# Patient Record
Sex: MALE | URBAN - METROPOLITAN AREA
[De-identification: names, ages, dates, MRNs, and addresses within clinical notes are randomized per-mention and may not be internally consistent; named-entity substitution may affect disease eponyms.]

---

## 2024-07-20 ENCOUNTER — OFFICE VISIT (OUTPATIENT)
Dept: URGENT CARE | Facility: CLINIC | Age: 64
End: 2024-07-20
Payer: COMMERCIAL

## 2024-07-20 VITALS
OXYGEN SATURATION: 98 % | DIASTOLIC BLOOD PRESSURE: 82 MMHG | HEART RATE: 96 BPM | RESPIRATION RATE: 20 BRPM | SYSTOLIC BLOOD PRESSURE: 164 MMHG | TEMPERATURE: 98 F

## 2024-07-20 DIAGNOSIS — N39.0 ACUTE UTI: Primary | ICD-10-CM

## 2024-07-20 DIAGNOSIS — R35.0 URINARY FREQUENCY: ICD-10-CM

## 2024-07-20 LAB
POC APPEARANCE, URINE: ABNORMAL
POC BILIRUBIN, URINE: ABNORMAL
POC BLOOD, URINE: ABNORMAL
POC COLOR, URINE: ABNORMAL
POC GLUCOSE, URINE: ABNORMAL MG/DL
POC KETONES, URINE: ABNORMAL MG/DL
POC LEUKOCYTES, URINE: ABNORMAL
POC NITRITE,URINE: NEGATIVE
POC PH, URINE: 5 PH
POC PROTEIN, URINE: ABNORMAL MG/DL
POC SPECIFIC GRAVITY, URINE: 1.02
POC UROBILINOGEN, URINE: 4 EU/DL

## 2024-07-20 PROCEDURE — 81002 URINALYSIS NONAUTO W/O SCOPE: CPT | Performed by: PHYSICIAN ASSISTANT

## 2024-07-20 PROCEDURE — 99203 OFFICE O/P NEW LOW 30 MIN: CPT | Performed by: PHYSICIAN ASSISTANT

## 2024-07-20 PROCEDURE — 87086 URINE CULTURE/COLONY COUNT: CPT

## 2024-07-20 PROCEDURE — 87186 SC STD MICRODIL/AGAR DIL: CPT

## 2024-07-20 RX ORDER — SULFAMETHOXAZOLE AND TRIMETHOPRIM 800; 160 MG/1; MG/1
1 TABLET ORAL 2 TIMES DAILY
Qty: 20 TABLET | Refills: 0 | Status: SHIPPED | OUTPATIENT
Start: 2024-07-20 | End: 2024-07-30

## 2024-07-20 ASSESSMENT — PAIN SCALES - GENERAL: PAINLEVEL: 4

## 2024-07-20 ASSESSMENT — ENCOUNTER SYMPTOMS: FREQUENCY: 1

## 2024-07-20 NOTE — PROGRESS NOTES
Subjective   Patient ID: Pascual Julien is a 63 y.o. male.    Patient is a 63-year-old male who complains of urinary frequency and urgency that he has been experiencing for the past 2 days.  Patient denies fever, chills, flank pain, hematuria, nausea or other symptoms.  Patient states he does have a history of kidney stone but at reports his current symptoms are not consistent with same.  Patient reports that he has had 1 previous urinary tract infection 24 years ago and believes that his current symptoms are due to a UTI.      Urinary Frequency  The following portions of the chart were reviewed this encounter and updated as appropriate:  Allergies       Review of Systems   Genitourinary:  Positive for frequency and urgency.   All other systems reviewed and are negative.  Objective   Physical Exam  Vitals and nursing note reviewed.   Constitutional:       Appearance: Normal appearance. He is normal weight.   Cardiovascular:      Pulses: Normal pulses.   Pulmonary:      Effort: Pulmonary effort is normal.      Breath sounds: Normal breath sounds.   Abdominal:      Palpations: Abdomen is soft.   Skin:     General: Skin is warm and dry.      Capillary Refill: Capillary refill takes less than 2 seconds.   Neurological:      General: No focal deficit present.      Mental Status: He is alert and oriented to person, place, and time.   Psychiatric:         Mood and Affect: Mood normal.         Behavior: Behavior normal.         Thought Content: Thought content normal.         Judgment: Judgment normal.     Assessment/Plan   Physical exam findings as noted above.  Urinalysis shows large leukocyte esterase with nitrite and urine culture was ordered.  Patient was provided with a prescription for Bactrim -160 mg and advised that results of the urine culture be available in 2 days.  Patient was informed that he would be contacted if there is a need to change his medication based on the sensitivity report.  Patient  verbalizes clear understanding the above instructions.    CLINICAL IMPRESSION:  Acute UTI    Diagnoses and all orders for this visit:  Acute UTI  -     Urine Culture  -     sulfamethoxazole-trimethoprim (Bactrim DS) 800-160 mg tablet; Take 1 tablet by mouth 2 times a day for 10 days.  Urinary frequency  -     POCT UA (nonautomated) manually resulted    Patient disposition: Home

## 2024-07-23 LAB — BACTERIA UR CULT: ABNORMAL
